# Patient Record
Sex: FEMALE | Race: WHITE
[De-identification: names, ages, dates, MRNs, and addresses within clinical notes are randomized per-mention and may not be internally consistent; named-entity substitution may affect disease eponyms.]

---

## 2018-06-11 ENCOUNTER — HOSPITAL ENCOUNTER (OUTPATIENT)
Dept: HOSPITAL 47 - RADMAMWWP | Age: 58
Discharge: HOME | End: 2018-06-11
Payer: COMMERCIAL

## 2018-06-11 DIAGNOSIS — Z12.31: Primary | ICD-10-CM

## 2018-06-11 PROCEDURE — 77063 BREAST TOMOSYNTHESIS BI: CPT

## 2018-06-11 PROCEDURE — 77067 SCR MAMMO BI INCL CAD: CPT

## 2018-06-12 NOTE — MM
Reason for exam: screening  (asymptomatic).

Last mammogram was performed 2 years and 7 months ago.



History:

Patient is postmenopausal.

Took hormonal contraceptives for 15 years beginning at age 20.



Physical Findings:

A clinical breast exam by your physician is recommended on an annual basis and 

results should be correlated with mammographic findings.



MG 3D Screening Mammo W/Cad

Bilateral CC and MLO view(s) were taken.

Prior study comparison: November 6, 2015, bilateral MG screening mammo w CAD.  

November 26, 2012, bilateral digital screening mammo w/CAD.

The breast tissue is extremely dense which could obscure a lesion on mammography. 

Benign appearing bilateral calcifications.  No significant changes when compared 

with prior studies.





ASSESSMENT: Benign, BI-RAD 2



RECOMMENDATION:

Routine screening mammogram of both breasts in 1 year.

## 2019-10-31 ENCOUNTER — HOSPITAL ENCOUNTER (OUTPATIENT)
Dept: HOSPITAL 47 - RADMAMWWP | Age: 59
Discharge: HOME | End: 2019-10-31
Attending: PHYSICIAN ASSISTANT
Payer: COMMERCIAL

## 2019-10-31 ENCOUNTER — HOSPITAL ENCOUNTER (OUTPATIENT)
Dept: HOSPITAL 47 - RADUSWWP | Age: 59
Discharge: HOME | End: 2019-10-31
Attending: PHYSICIAN ASSISTANT
Payer: COMMERCIAL

## 2019-10-31 DIAGNOSIS — L98.8: Primary | ICD-10-CM

## 2019-10-31 DIAGNOSIS — Z12.31: Primary | ICD-10-CM

## 2019-10-31 PROCEDURE — 77067 SCR MAMMO BI INCL CAD: CPT

## 2019-10-31 PROCEDURE — 77063 BREAST TOMOSYNTHESIS BI: CPT

## 2019-10-31 NOTE — US
EXAMINATION TYPE: US axilla LT

 

DATE OF EXAM: 10/31/2019

 

COMPARISON: NONE

 

CLINICAL HISTORY: 59-year-old female R22.30 Localized swelling, mass and lump, unspec. 

 

TECHNIQUE: Multiple sonographic images of the left axilla including the upper third posterior arm cor
responding to the palpable site.

 

FINDINGS:

Left posterior humerus upper 1/3 area palpable. Echogenic focal round lesion non vascular measuring 0
.6 x 0.5 x 0.7 cm 

 

 

 

 

 

IMPRESSION:  

Targeted scanning to the palpable site along the posterior upper left arm shows a 7 mm round echogeni
c lesion in the subcutaneous adipose. Probable small hematoma or area of fat necrosis. Three-month fo
llow-up recommended to reassess.

## 2020-02-20 ENCOUNTER — HOSPITAL ENCOUNTER (OUTPATIENT)
Dept: HOSPITAL 47 - RADUSWWP | Age: 60
Discharge: HOME | End: 2020-02-20
Attending: CLINICAL NURSE SPECIALIST
Payer: COMMERCIAL

## 2020-02-20 DIAGNOSIS — R22.30: Primary | ICD-10-CM

## 2020-02-21 NOTE — US
EXAMINATION TYPE: US axilla LT

 

DATE OF EXAM: 2/20/2020

 

COMPARISON: 10/31/2019

 

CLINICAL HISTORY: 60-year-old female R22.30 Lump in Armpit. Sonographer notes: F/U lump left posterio
r upper arm, pt states palpable still present and feels unchanged from previous

 

Technique: Targeted ultrasound examination at the patient's palpable site along the upper posterior l
eft arm near the axilla.

 

FINDINGS:

**Area of palpable left upper posterior arm visualized and appears unchanged from previous- focal ech
ogenic round area just deep to the skin surface within the subcutaneous adipose layer, 8mm in size**

 

 

 

 

 

IMPRESSION: Stable 8 mm echogenic lesion just deep to the skin surface within the subcutaneous adipos
e layer corresponding to the palpable site. Possible area of fat necrosis or small subcutaneous lipom
a. If any growth is noted, the area can be rescanned. If symptomatic, excision could be considered.

## 2022-10-21 ENCOUNTER — HOSPITAL ENCOUNTER (OUTPATIENT)
Dept: HOSPITAL 47 - RADNMMAIN | Age: 62
Discharge: HOME | End: 2022-10-21
Attending: FAMILY MEDICINE
Payer: COMMERCIAL

## 2022-10-21 DIAGNOSIS — R94.31: Primary | ICD-10-CM

## 2022-10-21 DIAGNOSIS — I10: ICD-10-CM

## 2022-10-21 PROCEDURE — 93017 CV STRESS TEST TRACING ONLY: CPT

## 2022-10-21 NOTE — CA
Exercise Stress Test Report 

 

 Name:    Nisreen Crockett 

 

 MRN:    [id number] 

 

 Exam Date: 10/21/2022 11:05 

 

 Exam Location:      [facility] 

 

 Ht (in):     65     Wt (lb):     210    BSA:    2.02 

 

 Ordering Phys:       KAIT 

 

 Referring Phys:      CARBALLO 

 

 Technologist:        García Tiwari 

 

 Age:    62    Gender:    F 

 

 :    1960 

 Procedure CPT: 

 

 Indications: 

 

 ICD-10 Codes: 

 

 Patient History:          PALPITATIONS, HTN, FAMILY HX OF HEART  

                           DISEASE, ABN ECG 

 

 Medications:              LISINOPRIL,,,,,, ELOQUIS,,,,,,  

                           LEXAPRO,,,,,, VIT D,,,,,, VIT B  

                           COMPLEX,,,,, 

 

 Meds past 24 hrs: 

 

 Pretest Chest Pain: 

 

 STRESS TEST      [place] 

 

 Protocol 

 

 

 

 

 Exercise Duration (min:sec):         06:30 

 Max ST Depressions (mm): 

 Angina Score: 

 Lewis Score: 

 Resting HR (bpm):      82 

 

 Peak HR (bpm):         141 

 

 Resting BP (mmHg):       141    /   94 

 

 Peak BP (mmHg):       198   /   99 

 

 MPHR:    158     Target HR:      134 

 

 % MPHR:     89 

 METS:  8.0 

 

 Total Dose: 

 Peak Dose: 

 Atropine: 

 Double Product:       63620 

 

 BP Response: 

 

 Stress Termination:       TARGET HR REACHED/MAX EXERTION 

 

 Stress Symptoms: 

 

 

 Stress Summary: 

 

 

  ECG ANALYSIS 

 

 Resting ECG: 

 

 Stress ECG: 

 

 CONCLUSIONS 

 Patient underwent exercise stress EKG with a Frederick protocol  

 treadmill stress test.  Patient exercised into Stage [] for a  

 total of 2 reaching a total of 6 minutes and 30 seconds.   

 Patient's maximum heart rate was 141 which represented 89 % age- 

 predicted maximum heart rate. 

 

 Stress EKG findings: 

 At baseline patient's EKG showed normal sinus rhythm, normal  

 axis, no significant ST or T wave abnormalities.  At peak  

 exercise, EKG showed nonspecific and nondiagnostic 0.5 mm  

 upsloping ST depressions in the inferior and lateral leads. 

 

 

 Conclusions: 

 1.  Normal EKG response to exercise without evidence of  

 inducible ischemia. 

 2.  Fair exercise capacity. 

 

 Dr. Bruce Sewell DO 

 (Electronically Signed) 

 Final Date:      2022 12:26

## 2024-11-14 ENCOUNTER — HOSPITAL ENCOUNTER (OUTPATIENT)
Dept: HOSPITAL 47 - RADMAMWWP | Age: 64
Discharge: HOME | End: 2024-11-14
Attending: FAMILY MEDICINE
Payer: COMMERCIAL

## 2024-11-14 DIAGNOSIS — Z78.0: ICD-10-CM

## 2024-11-14 DIAGNOSIS — Z12.31: Primary | ICD-10-CM

## 2024-11-14 DIAGNOSIS — R92.333: ICD-10-CM

## 2024-11-14 PROCEDURE — 77063 BREAST TOMOSYNTHESIS BI: CPT

## 2024-11-14 PROCEDURE — 77067 SCR MAMMO BI INCL CAD: CPT

## 2024-11-14 PROCEDURE — 77080 DXA BONE DENSITY AXIAL: CPT
